# Patient Record
Sex: FEMALE | URBAN - METROPOLITAN AREA
[De-identification: names, ages, dates, MRNs, and addresses within clinical notes are randomized per-mention and may not be internally consistent; named-entity substitution may affect disease eponyms.]

---

## 2024-06-05 VITALS
TEMPERATURE: 98 F | HEIGHT: 67 IN | RESPIRATION RATE: 16 BRPM | DIASTOLIC BLOOD PRESSURE: 83 MMHG | OXYGEN SATURATION: 100 % | HEART RATE: 90 BPM | SYSTOLIC BLOOD PRESSURE: 124 MMHG | WEIGHT: 183.42 LBS

## 2024-06-06 ENCOUNTER — OUTPATIENT (OUTPATIENT)
Dept: INPATIENT UNIT | Facility: HOSPITAL | Age: 39
LOS: 1 days | Discharge: HOME | End: 2024-06-06
Payer: COMMERCIAL

## 2024-06-06 VITALS
RESPIRATION RATE: 20 BRPM | OXYGEN SATURATION: 98 % | DIASTOLIC BLOOD PRESSURE: 55 MMHG | SYSTOLIC BLOOD PRESSURE: 102 MMHG | HEART RATE: 84 BPM

## 2024-06-06 DIAGNOSIS — Z98.891 HISTORY OF UTERINE SCAR FROM PREVIOUS SURGERY: Chronic | ICD-10-CM

## 2024-06-06 PROCEDURE — 86900 BLOOD TYPING SEROLOGIC ABO: CPT

## 2024-06-06 PROCEDURE — 58662 LAPAROSCOPY EXCISE LESIONS: CPT

## 2024-06-06 PROCEDURE — 86901 BLOOD TYPING SEROLOGIC RH(D): CPT

## 2024-06-06 PROCEDURE — 88305 TISSUE EXAM BY PATHOLOGIST: CPT

## 2024-06-06 PROCEDURE — 88304 TISSUE EXAM BY PATHOLOGIST: CPT

## 2024-06-06 PROCEDURE — 86850 RBC ANTIBODY SCREEN: CPT

## 2024-06-06 PROCEDURE — C1889: CPT

## 2024-06-06 PROCEDURE — 88305 TISSUE EXAM BY PATHOLOGIST: CPT | Mod: 26

## 2024-06-06 PROCEDURE — 58545 LAPAROSCOPIC MYOMECTOMY: CPT

## 2024-06-06 PROCEDURE — 44970 LAPAROSCOPY APPENDECTOMY: CPT | Mod: XS

## 2024-06-06 PROCEDURE — C1765: CPT

## 2024-06-06 PROCEDURE — S2900: CPT

## 2024-06-06 PROCEDURE — 88304 TISSUE EXAM BY PATHOLOGIST: CPT | Mod: 26

## 2024-06-06 DEVICE — INTERCEED 3 X 4": Type: IMPLANTABLE DEVICE | Status: FUNCTIONAL

## 2024-06-06 DEVICE — SURGIFLO HEMOSTATIC MATRIX KIT: Type: IMPLANTABLE DEVICE | Status: FUNCTIONAL

## 2024-06-06 RX ORDER — CELECOXIB 200 MG/1
400 CAPSULE ORAL ONCE
Refills: 0 | Status: COMPLETED | OUTPATIENT
Start: 2024-06-06 | End: 2024-06-06

## 2024-06-06 RX ORDER — OXYCODONE HYDROCHLORIDE 5 MG/1
10 TABLET ORAL EVERY 4 HOURS
Refills: 0 | Status: DISCONTINUED | OUTPATIENT
Start: 2024-06-06 | End: 2024-06-06

## 2024-06-06 RX ORDER — SODIUM CHLORIDE 9 MG/ML
1000 INJECTION, SOLUTION INTRAVENOUS
Refills: 0 | Status: DISCONTINUED | OUTPATIENT
Start: 2024-06-06 | End: 2024-06-06

## 2024-06-06 RX ORDER — ACETAMINOPHEN 500 MG
1000 TABLET ORAL EVERY 6 HOURS
Refills: 0 | Status: DISCONTINUED | OUTPATIENT
Start: 2024-06-06 | End: 2024-06-06

## 2024-06-06 RX ORDER — ONDANSETRON 8 MG/1
8 TABLET, FILM COATED ORAL EVERY 6 HOURS
Refills: 0 | Status: DISCONTINUED | OUTPATIENT
Start: 2024-06-06 | End: 2024-06-06

## 2024-06-06 RX ORDER — SIMETHICONE 80 MG/1
80 TABLET, CHEWABLE ORAL EVERY 6 HOURS
Refills: 0 | Status: DISCONTINUED | OUTPATIENT
Start: 2024-06-06 | End: 2024-06-06

## 2024-06-06 RX ORDER — KETOROLAC TROMETHAMINE 30 MG/ML
30 SYRINGE (ML) INJECTION EVERY 6 HOURS
Refills: 0 | Status: DISCONTINUED | OUTPATIENT
Start: 2024-06-06 | End: 2024-06-06

## 2024-06-06 RX ORDER — HYDROMORPHONE HYDROCHLORIDE 2 MG/ML
0.5 INJECTION INTRAMUSCULAR; INTRAVENOUS; SUBCUTANEOUS
Refills: 0 | Status: DISCONTINUED | OUTPATIENT
Start: 2024-06-06 | End: 2024-06-06

## 2024-06-06 RX ORDER — ACETAMINOPHEN 500 MG
1000 TABLET ORAL ONCE
Refills: 0 | Status: COMPLETED | OUTPATIENT
Start: 2024-06-06 | End: 2024-06-06

## 2024-06-06 RX ORDER — OXYCODONE HYDROCHLORIDE 5 MG/1
5 TABLET ORAL
Refills: 0 | Status: DISCONTINUED | OUTPATIENT
Start: 2024-06-06 | End: 2024-06-06

## 2024-06-06 RX ORDER — HEPARIN SODIUM 5000 [USP'U]/ML
5000 INJECTION INTRAVENOUS; SUBCUTANEOUS ONCE
Refills: 0 | Status: COMPLETED | OUTPATIENT
Start: 2024-06-06 | End: 2024-06-06

## 2024-06-06 RX ORDER — METOCLOPRAMIDE HCL 10 MG
10 TABLET ORAL EVERY 6 HOURS
Refills: 0 | Status: DISCONTINUED | OUTPATIENT
Start: 2024-06-06 | End: 2024-06-06

## 2024-06-06 RX ADMIN — Medication 1000 MILLIGRAM(S): at 15:57

## 2024-06-06 RX ADMIN — CELECOXIB 400 MILLIGRAM(S): 200 CAPSULE ORAL at 07:19

## 2024-06-06 RX ADMIN — SIMETHICONE 80 MILLIGRAM(S): 80 TABLET, CHEWABLE ORAL at 15:57

## 2024-06-06 RX ADMIN — HEPARIN SODIUM 5000 UNIT(S): 5000 INJECTION INTRAVENOUS; SUBCUTANEOUS at 07:18

## 2024-06-06 RX ADMIN — Medication 1000 MILLIGRAM(S): at 07:18

## 2024-06-06 NOTE — PRE-ANESTHESIA EVALUATION ADULT - NSANTHPEFT_GEN_ALL_CORE
Patient Instructions by Jason Hinds at 11/13/18 08:39 AM     Author:  Jason Hinds Service:  (none) Author Type:  Medical Assistant     Filed:  11/13/18 08:39 AM Encounter Date:  11/13/2018 Status:  Signed     :  Jason Hinds (Medical Assistant)            PATIENT INFORMATION     -- Please call [facility]: Phone: [phone] to schedule your SMALL BOWEL FOLLOW THROUGH     Follow-Up  None    Additional Educational Resources: For additional resources regarding your symptoms, diagnosis, or further health information, please visit the Health Resources section on [email or web address] or the Online Health Resources section in [employer].             Revision History        User Key Date/Time User Provider Type Action    > [N/A] 11/13/18 08:39 AM Jason Hinds Medical Assistant Sign
well developed well nourished female in NAD  awake, alert, and oriented  +S1/S2  B/L air entry
Staged Advancement Flap Text: The defect edges were debeveled with a #15 scalpel blade.  Given the location of the defect, shape of the defect and the proximity to free margins a staged advancement flap was deemed most appropriate.  Using a sterile surgical marker, an appropriate advancement flap was drawn incorporating the defect and placing the expected incisions within the relaxed skin tension lines where possible. The area thus outlined was incised deep to adipose tissue with a #15 scalpel blade.  The skin margins were undermined to an appropriate distance in all directions utilizing iris scissors.

## 2024-06-06 NOTE — ASU DISCHARGE PLAN (ADULT/PEDIATRIC) - NS MD DC FALL RISK RISK
For information on Fall & Injury Prevention, visit: [email or web address] OR  [email or web address] OR  https://www.cdc.gov/steadi/patient.html

## 2024-06-06 NOTE — PROGRESS NOTE ADULT - ASSESSMENT
s/p RA myomectomy, Cystectomy, appendectomy  Doing well in PACU  await PACU milestones (ambulation, eating) and anticipate d/c to home
37 yo F with PMHx of Endometriosis presents for RA ovarian cystectomy is now s/p  Robotic assisted appendectomy and lysis of adhesions.     P: Patient's POC is within normal limits and pain is controlled. Patient can be discharged if meets PACU criteria. ASU discharge done.

## 2024-06-06 NOTE — PRE-ANESTHESIA EVALUATION ADULT - NSANTHOSAYNRD_GEN_A_CORE
No. JHOANA screening performed.  STOP BANG Legend: 0-2 = LOW Risk; 3-4 = INTERMEDIATE Risk; 5-8 = HIGH Risk

## 2024-06-06 NOTE — ASU DISCHARGE PLAN (ADULT/PEDIATRIC) - CARE PROVIDER_API CALL
Katie Bernal  Obstetrics and Gynecology  [address] [place] [address], [address] [address]  Phone: [phone]  Fax: [phone]  Follow Up Time:

## 2024-06-06 NOTE — ASU DISCHARGE PLAN (ADULT/PEDIATRIC) - ASU DC SPECIAL INSTRUCTIONSFT
- Nothing in vagina - no intercourse, tampons, or douching until cleared by your doctor.   - Avoid swimming, tub baths, strenuous activity and heavy lifting until cleared by your doctor.   - Showering is ok.   - Continue oral pain medications as needed for pain.    - Follow up in office in 2 weeks for your postoperative visit.  Call the office to confirm your follow up appointment.   - Call the office sooner if you develop any fever, heavy bleeding, or severe pain.  Go to the closest emergency room for any of these symptoms if you are not able to contact your doctor. - Please only drink clear liquids until you pass gas (liquids, broth). Once you pass gas, you can start eating food    - Nothing in vagina - no intercourse, tampons, or douching until cleared by your doctor.   - Avoid swimming, tub baths, strenuous activity and heavy lifting until cleared by your doctor.   - Showering is ok.   - Continue oral pain medications as needed for pain.    - Follow up in office in 2 weeks for your postoperative visit.  Call the office to confirm your follow up appointment.   - Call the office sooner if you develop any fever, heavy bleeding, or severe pain.  Go to the closest emergency room for any of these symptoms if you are not able to contact your doctor.

## 2024-06-06 NOTE — BRIEF OPERATIVE NOTE - OPERATION/FINDINGS
GYN primary service - obtained abdominal access and to complete myomectomy. Please see GYN op note for full dictation.    Surgery assisted intraoperatively with appendectomy and lysis of adhesions: Appendix vermiform but not acutely inflamed with large mesoappendix. Plane developed at appendiceal base with monopolar scissors. Meso taken with energy. Appendiceal artery skeletonized and taken with bipolar and PDS endoloop. Appendix taken with PDS endoloop and monopolar scissors. Lumen cauterized. Pelvic adhesions between uterus and rectum dissected bluntly with robotic suction. Rectum mobilized free of surrounding structures without issue.     GYN to complete case. 
EBL 50    R ovarian endometrioma   multiple fibroids removed, subserosal and intramural  adenomyosis removed from fundus  appendectomy performed by general surgery

## 2024-06-06 NOTE — BRIEF OPERATIVE NOTE - COMMENTS
SURGERY POST-OP INSTRUCTIONS    Bedrest x4 days, ice packs at all times, at least 2L water every 24hrs, 2 extra strength Tylenol and 1 Advil every six hours standing, liquid diet until bowel movement, oxycodone if needed.

## 2024-06-06 NOTE — ASU DISCHARGE PLAN (ADULT/PEDIATRIC) - CALL YOUR DOCTOR IF YOU HAVE ANY OF THE FOLLOWING:
Pain not relieved by Medications/Fever greater than (need to indicate Fahrenheit or Celsius)/Unable to urinate 126

## 2024-06-06 NOTE — PROGRESS NOTE ADULT - SUBJECTIVE AND OBJECTIVE BOX
POST-OPERATIVE NOTE    Procedure: Robotic assisted appendectomy and lysis of adhesions    Surgeon: Dr. Schumacher     S: Pt seen in PACU and resting comfortably in bed. Patient reports no acute complaints. Denies fever, chills, nausea, vomiting, chest, SOB, JACKSON, calf tenderness. Pain controlled with medication. Denies nausea, vomiting.    O:  T(C): 36.4 (06-06-24 @ 12:09), Max: 36.4 (06-06-24 @ 12:09)  T(F): 97.6 (06-06-24 @ 12:09), Max: 97.6 (06-06-24 @ 12:09)  HR: 80 (06-06-24 @ 13:24) (78 - 88)  BP: 112/65 (06-06-24 @ 13:24) (100/60 - 119/56)  RR: 11 (06-06-24 @ 13:24) (10 - 15)  SpO2: 100% (06-06-24 @ 13:24) (96% - 100%)  Wt(kg): --            Gen: NAD, resting comfortably in bed  Pulm: Nonlabored breathing, no respiratory distress  Abd: soft, NT/ND. No rebound or gaurding Incisions are clean/dry/intact.   Extrem: WWP, no calf edema or tenderness, SCDs in place      A/P: 38y Female s/p above appendicitis   Team 4 will monitor
GYN POC   Patient seen at bedside.  Pain controlled.  Standing up to move to chair    Denies CP, SOB.   Has not yet voided or had sips of water.    Vital Signs Last 24 Hrs  T(C): 36.4 (06 Jun 2024 12:09), Max: 36.6 (06 Jun 2024 07:15)  T(F): 97.6 (06 Jun 2024 12:09), Max: 97.6 (06 Jun 2024 12:09)  HR: 80 (06 Jun 2024 13:24) (78 - 90)  BP: 112/65 (06 Jun 2024 13:24) (100/60 - 124/83)  BP(mean): 80 (06 Jun 2024 13:24) (73 - 82)  RR: 11 (06 Jun 2024 13:24) (10 - 16)  SpO2: 100% (06 Jun 2024 13:24) (96% - 100%)    Parameters below as of 06 Jun 2024 13:09    O2 Flow (L/min): 2      Physical Exam:  Gen: No Acute Distress  Pulm: Breathing comfortably  GI: soft, appropriately tender, no rebound or guarding. Incisions clean/dry/intact.  Ext: SCDs in place, wnl    I&O's Summary    MEDICATIONS  (STANDING):  acetaminophen     Tablet .. 1000 milliGRAM(s) Oral every 6 hours  ketorolac   Injectable 30 milliGRAM(s) IV Push every 6 hours  lactated ringers. 1000 milliLiter(s) (125 mL/Hr) IV Continuous <Continuous>  simethicone 80 milliGRAM(s) Chew every 6 hours    MEDICATIONS  (PRN):  acetaminophen     Tablet .. 1000 milliGRAM(s) Oral every 6 hours PRN Mild Pain (1 - 3)  HYDROmorphone  Injectable 0.5 milliGRAM(s) IV Push every 15 minutes PRN Severe Pain (7 - 10)  metoclopramide Injectable 10 milliGRAM(s) IV Push every 6 hours PRN nausea/vomiting  ondansetron Injectable 8 milliGRAM(s) IV Push every 6 hours PRN Nausea and/or Vomiting  oxyCODONE    IR 5 milliGRAM(s) Oral every 3 hours PRN Moderate Pain (4 - 6)  oxyCODONE    IR 10 milliGRAM(s) Oral every 4 hours PRN Severe Pain (7 - 10)    Allergies    No Known Drug Allergies  Pineapple (Hives)  shellfish (Short breath; Hives)    Intolerances        LABS:

## 2024-06-06 NOTE — BRIEF OPERATIVE NOTE - NSICDXBRIEFPROCEDURE_GEN_ALL_CORE_FT
PROCEDURES:  Myomectomy, uterus, 5 or more leiomyomata, robot-assisted, laparoscopic 06-Jun-2024 12:26:28  Franklin David  Robot-assisted ovarian cystectomy 06-Jun-2024 12:26:42  Franklin David  
PROCEDURES:  Robot-assisted appendectomy 06-Jun-2024 09:54:50  Grzegorz Aldrich  Robot-assisted laparoscopic lysis of adhesions 06-Jun-2024 09:55:22  Grzegorz Aldrich

## 2024-06-24 LAB — SURGICAL PATHOLOGY STUDY: SIGNIFICANT CHANGE UP

## (undated) DEVICE — SUT MONOCRYL 4-0 27" PS-2 UNDYED

## (undated) DEVICE — TUBING STRYKER PNEUMOCLEAR SMOKE EVACUATION HIGH FLOW

## (undated) DEVICE — ENDOCATCH 10MM SPECIMEN POUCH

## (undated) DEVICE — TIP METZENBAUM SCISSOR MONOPOLAR ENDOCUT (ORANGE)

## (undated) DEVICE — BAG DECANTER IV STERILE

## (undated) DEVICE — PACK PERI GYN

## (undated) DEVICE — Device

## (undated) DEVICE — DRSG DERMABOND 0.7ML

## (undated) DEVICE — INSUFFLATION NDL COVIDIEN SURGINEEDLE VERESS 120MM

## (undated) DEVICE — APPLICATOR ENDOSCOPIC FOR SUGIFLO

## (undated) DEVICE — GLV 8 PROTEXIS (WHITE)

## (undated) DEVICE — FOLEY TRAY 16FR 5CC LF UMETER CLOSED

## (undated) DEVICE — SUT VICRYL 0 27" UR-6

## (undated) DEVICE — NDL WILLIAMS CYSTOSCOPIC INJECTION 5FR 23G X 35CM

## (undated) DEVICE — XI ENDOWRIST SUCTION IRRIGATOR 8MM

## (undated) DEVICE — XI OBTURATOR OPTICAL BLADELESS 8MM

## (undated) DEVICE — XI ARM SCISSOR MONO CURVED

## (undated) DEVICE — XI ARM FORCEP FENESTRATED BIPOLAR 8MM

## (undated) DEVICE — XI ARM FORCEP TENACULUM

## (undated) DEVICE — XI DRAPE ARM

## (undated) DEVICE — XI ARM NEEDLE DRIVER LARGE

## (undated) DEVICE — SUT PDS II 0 18" ENDOLOOP LIGATURE

## (undated) DEVICE — PREP CHLOROHEXIDINE 4% 118CC KIT

## (undated) DEVICE — SUT VLOC 180 0 9" GS-21 GREEN

## (undated) DEVICE — VENODYNE/SCD SLEEVE CALF MEDIUM

## (undated) DEVICE — XI TIP COVER

## (undated) DEVICE — SYR LUER LOK 30CC

## (undated) DEVICE — TROCAR APPLIED MEDICAL KII FIOS FIRST ENTRY 12MM X 100MM ADVANCED FIXATION

## (undated) DEVICE — PACK GYN WDC

## (undated) DEVICE — POSITIONER PINK PAD PIGAZZI SYSTEM XL W ARM PROTECTOR

## (undated) DEVICE — XI SEAL UNIV 5- 8 MM

## (undated) DEVICE — DRAPE INSTRUMENT POUCH 10" X 18"

## (undated) DEVICE — XI DRAPE COLUMN